# Patient Record
Sex: MALE | Race: WHITE | NOT HISPANIC OR LATINO | URBAN - METROPOLITAN AREA
[De-identification: names, ages, dates, MRNs, and addresses within clinical notes are randomized per-mention and may not be internally consistent; named-entity substitution may affect disease eponyms.]

---

## 2022-03-17 ENCOUNTER — TELEPHONE ENCOUNTER (OUTPATIENT)
Dept: URBAN - METROPOLITAN AREA CLINIC 9 | Facility: CLINIC | Age: 87
End: 2022-03-17

## 2022-05-31 ENCOUNTER — OFFICE VISIT (OUTPATIENT)
Dept: URBAN - METROPOLITAN AREA CLINIC 60 | Facility: CLINIC | Age: 87
End: 2022-05-31

## 2022-06-28 ENCOUNTER — CLAIMS CREATED FROM THE CLAIM WINDOW (OUTPATIENT)
Dept: URBAN - METROPOLITAN AREA CLINIC 60 | Facility: CLINIC | Age: 87
End: 2022-06-28
Payer: MEDICARE

## 2022-06-28 ENCOUNTER — DASHBOARD ENCOUNTERS (OUTPATIENT)
Age: 87
End: 2022-06-28

## 2022-06-28 ENCOUNTER — LAB OUTSIDE AN ENCOUNTER (OUTPATIENT)
Dept: URBAN - METROPOLITAN AREA CLINIC 60 | Facility: CLINIC | Age: 87
End: 2022-06-28

## 2022-06-28 ENCOUNTER — WEB ENCOUNTER (OUTPATIENT)
Dept: URBAN - METROPOLITAN AREA CLINIC 60 | Facility: CLINIC | Age: 87
End: 2022-06-28

## 2022-06-28 VITALS
SYSTOLIC BLOOD PRESSURE: 124 MMHG | TEMPERATURE: 98.2 F | DIASTOLIC BLOOD PRESSURE: 86 MMHG | BODY MASS INDEX: 24.34 KG/M2 | HEART RATE: 67 BPM | WEIGHT: 170 LBS | OXYGEN SATURATION: 98 % | HEIGHT: 70 IN

## 2022-06-28 DIAGNOSIS — Z80.0 FAMILY HISTORY OF COLON CANCER: ICD-10-CM

## 2022-06-28 DIAGNOSIS — Z12.11 COLON CANCER SCREENING: ICD-10-CM

## 2022-06-28 DIAGNOSIS — Z86.010 PERSONAL HISTORY OF COLONIC POLYPS: ICD-10-CM

## 2022-06-28 DIAGNOSIS — Z01.818 ENCOUNTER FOR OTHER PREPROCEDURAL EXAMINATION: ICD-10-CM

## 2022-06-28 PROBLEM — 428283002: Status: ACTIVE | Noted: 2022-06-28

## 2022-06-28 PROCEDURE — 99203 OFFICE O/P NEW LOW 30 MIN: CPT | Performed by: NURSE PRACTITIONER

## 2022-06-28 RX ORDER — OMEPRAZOLE 20 MG/1
1 CAPSULE 30 MINUTES BEFORE MORNING MEAL CAPSULE, DELAYED RELEASE ORAL ONCE A DAY
Status: ACTIVE | COMMUNITY

## 2022-06-28 RX ORDER — CETIRIZINE HYDROCHLORIDE 10 MG/1
1 TABLET TABLET, FILM COATED ORAL ONCE A DAY
Status: ACTIVE | COMMUNITY

## 2022-06-28 RX ORDER — HYDROCHLOROTHIAZIDE 25 MG/1
1 TABLET IN THE MORNING TABLET ORAL ONCE A DAY
Status: ACTIVE | COMMUNITY

## 2022-06-28 RX ORDER — BUDESONIDE AND FORMOTEROL FUMARATE DIHYDRATE 160; 4.5 UG/1; UG/1
2 PUFFS AEROSOL RESPIRATORY (INHALATION) TWICE A DAY
Status: ACTIVE | COMMUNITY

## 2022-06-28 RX ORDER — ATORVASTATIN CALCIUM 40 MG/1
1 TABLET TABLET, FILM COATED ORAL ONCE A DAY
Status: ACTIVE | COMMUNITY

## 2022-06-28 RX ORDER — SUCRALFATE 1 G/1
1 TABLET ON AN EMPTY STOMACH TABLET ORAL TWICE A DAY
Status: ACTIVE | COMMUNITY

## 2022-06-28 RX ORDER — MONTELUKAST 10 MG/1
1 TABLET TABLET, FILM COATED ORAL ONCE A DAY
Status: ACTIVE | COMMUNITY

## 2022-06-28 NOTE — PHYSICAL EXAM NECK/THYROID:
normal appearance , without tenderness upon palpation , no deformities , Thyroid normal size , no thyroid nodules , no masses , no JVD , thyroid nontender

## 2022-06-28 NOTE — HPI-TODAY'S VISIT:
This patient with a history of chronic GERD presents to the office for follow up. He is also due for colorectal cancer screening based on his history of a 12mm tubular adenoma in 2019. He has a family history of colon cancer.   Today he reports doing well. He remains on Omeprazole 20mg daily which [facility] and he denies heartburn, dysphagia or reflux providing he takes his medication. He denies consumption of multiple gastrotoxins. He denies any current bowel problems including diarrhea, constipation, change in bowel habits, dark stools, abdominal pain, rectal bleeding or weight loss.   His last colonoscopy was in May 2019. Findings included a 12mm  tubular adenoma removed from the ascending colon and 2 x 5mm hyperplastic polyps removed from the rectum. Diverticulosis was noted in the descending and sigmoid colon and small internal hemorrhoids were found. A benign anal tag was noted. His EGD at that time revealed mild chronic gastritis, fundic gland polyps a 3cm hiatal hernia, mild esophagitis.

## 2022-06-28 NOTE — PHYSICAL EXAM CHEST:
no lesions, no deformities, no traumatic injuries, no significant scars are present, no masses present, breathing is unlabored without accessory muscle use,normal breath sounds

## 2022-07-09 ENCOUNTER — TELEPHONE ENCOUNTER (OUTPATIENT)
Dept: URBAN - METROPOLITAN AREA CLINIC 121 | Facility: CLINIC | Age: 87
End: 2022-07-09

## 2022-07-09 RX ORDER — ASPIRIN 81 MG/1
TABLET, COATED ORAL ONCE A DAY
Refills: 0 | OUTPATIENT
Start: 2015-08-06 | End: 2016-02-18

## 2022-07-09 RX ORDER — SUCRALFATE 1 G/1
TABLET ORAL
Refills: 0 | OUTPATIENT
Start: 2014-07-21 | End: 2015-08-06

## 2022-07-09 RX ORDER — MONTELUKAST SODIUM 10 MG/1
TABLET, FILM COATED ORAL
Refills: 0 | OUTPATIENT
Start: 2015-08-06 | End: 2016-02-18

## 2022-07-09 RX ORDER — HYDROCHLOROTHIAZIDE 12.5 MG/1
CAPSULE ORAL
Refills: 0 | OUTPATIENT
Start: 2013-06-03 | End: 2014-07-21

## 2022-07-09 RX ORDER — HYDROCHLOROTHIAZIDE 12.5 MG/1
CAPSULE ORAL
Refills: 0 | OUTPATIENT
Start: 2011-01-31 | End: 2011-05-02

## 2022-07-09 RX ORDER — HYDROCHLOROTHIAZIDE 12.5 MG/1
CAPSULE ORAL
Refills: 0 | OUTPATIENT
Start: 2011-05-02 | End: 2013-06-03

## 2022-07-09 RX ORDER — HYDROCHLOROTHIAZIDE 12.5 MG/1
CAPSULE ORAL
Refills: 0 | OUTPATIENT
Start: 2014-07-21 | End: 2015-08-06

## 2022-07-09 RX ORDER — OMEPRAZOLE 20 MG/1
TABLET, DELAYED RELEASE ORAL ONCE A DAY
Refills: 0 | OUTPATIENT
Start: 2015-08-06 | End: 2016-02-18

## 2022-07-09 RX ORDER — MONTELUKAST 10 MG/1
TABLET, FILM COATED ORAL ONCE A DAY
Refills: 0 | OUTPATIENT
Start: 2018-04-11 | End: 2019-03-06

## 2022-07-09 RX ORDER — HYDROCHLOROTHIAZIDE 12.5 MG/1
CAPSULE ORAL ONCE A DAY
Refills: 0 | OUTPATIENT
Start: 2015-08-06 | End: 2016-02-18

## 2022-07-09 RX ORDER — ASPIRIN 81 MG/1
TABLET, DELAYED RELEASE ORAL ONCE A DAY
Refills: 0 | OUTPATIENT
Start: 2018-04-11 | End: 2019-03-06

## 2022-07-09 RX ORDER — OMEPRAZOLE 20 MG/1
TABLET, DELAYED RELEASE ORAL ONCE A DAY
Refills: 0 | OUTPATIENT
Start: 2014-07-21 | End: 2015-08-06

## 2022-07-09 RX ORDER — IBUPROFEN 200 MG
TABLET ORAL ONCE A DAY
Refills: 0 | OUTPATIENT
Start: 2018-04-11 | End: 2019-03-06

## 2022-07-09 RX ORDER — HYDROCHLOROTHIAZIDE 12.5 MG/1
CAPSULE ORAL ONCE A DAY
Refills: 0 | OUTPATIENT
Start: 2017-04-04 | End: 2018-04-11

## 2022-07-09 RX ORDER — METOPROLOL SUCCINATE 50 MG/1
TABLET, EXTENDED RELEASE ORAL
Refills: 0 | OUTPATIENT
Start: 2010-10-12 | End: 2011-01-31

## 2022-07-09 RX ORDER — HYDROCHLOROTHIAZIDE 25 MG/1
TABLET ORAL ONCE A DAY
Refills: 0 | OUTPATIENT
Start: 2019-03-06 | End: 2019-03-06

## 2022-07-09 RX ORDER — METOPROLOL SUCCINATE 50 MG/1
TABLET, EXTENDED RELEASE ORAL
Refills: 0 | OUTPATIENT
Start: 2015-08-06 | End: 2016-02-18

## 2022-07-09 RX ORDER — OMEPRAZOLE 20 MG/1
CAPSULE, DELAYED RELEASE ORAL ONCE A DAY
Refills: 0 | OUTPATIENT
Start: 2017-04-04 | End: 2018-04-11

## 2022-07-09 RX ORDER — ACETAMINOPHEN, DEXTROMETHORPHAN HBR, DOXYLAMINE SUCCINATE, PHENYLEPHRINE HCL 10-5-325MG
KIT ORAL ONCE A DAY
Refills: 0 | OUTPATIENT
Start: 2018-04-11 | End: 2019-03-06

## 2022-07-09 RX ORDER — CETIRIZINE HYDROCHLORIDE 10 MG/1
TABLET, FILM COATED ORAL ONCE A DAY
Refills: 0 | OUTPATIENT
Start: 2019-03-06 | End: 2019-03-06

## 2022-07-09 RX ORDER — CHOLECALCIFEROL (VITAMIN D3) 25 MCG
CAPSULE ORAL ONCE A DAY
Refills: 0 | OUTPATIENT
Start: 2015-08-06 | End: 2016-02-18

## 2022-07-09 RX ORDER — METOPROLOL SUCCINATE 50 MG/1
TABLET, EXTENDED RELEASE ORAL ONCE A DAY
Refills: 0 | OUTPATIENT
Start: 2018-04-11 | End: 2019-03-06

## 2022-07-09 RX ORDER — ASPIRIN 81 MG/1
TABLET, COATED ORAL
Refills: 0 | OUTPATIENT
Start: 2014-07-21 | End: 2015-08-06

## 2022-07-09 RX ORDER — ASPIRIN 81 MG/1
TABLET, COATED ORAL
Refills: 0 | OUTPATIENT
Start: 2011-05-02 | End: 2013-06-03

## 2022-07-09 RX ORDER — SIMVASTATIN 40 MG/1
TABLET, FILM COATED ORAL
Refills: 0 | OUTPATIENT
Start: 2010-10-12 | End: 2011-01-31

## 2022-07-09 RX ORDER — SUCRALFATE 1 G/1
TABLET ORAL TWICE A DAY
Refills: 0 | OUTPATIENT
Start: 2018-04-11 | End: 2019-03-06

## 2022-07-09 RX ORDER — OMEPRAZOLE 20 MG/1
TABLET, DELAYED RELEASE ORAL AS NEEDED
Refills: 0 | OUTPATIENT
Start: 2013-06-03 | End: 2013-06-03

## 2022-07-09 RX ORDER — NIACIN 1000 MG
TABLET, EXTENDED RELEASE ORAL
Refills: 0 | OUTPATIENT
Start: 2010-10-12 | End: 2011-01-31

## 2022-07-09 RX ORDER — MONTELUKAST SODIUM 10 MG/1
TABLET, FILM COATED ORAL
Refills: 0 | OUTPATIENT
Start: 2011-05-02 | End: 2013-06-03

## 2022-07-09 RX ORDER — METOPROLOL SUCCINATE 50 MG/1
TABLET, EXTENDED RELEASE ORAL ONCE A DAY
Refills: 0 | OUTPATIENT
Start: 2019-03-06 | End: 2019-03-06

## 2022-07-09 RX ORDER — SUCRALFATE 1 G/1
TABLET ORAL
Refills: 0 | OUTPATIENT
Start: 2013-10-28 | End: 2014-07-21

## 2022-07-09 RX ORDER — ASPIRIN 81 MG/1
TABLET, COATED ORAL
Refills: 0 | OUTPATIENT
Start: 2010-10-12 | End: 2011-01-31

## 2022-07-09 RX ORDER — MONTELUKAST 10 MG/1
TABLET, FILM COATED ORAL ONCE A DAY
Refills: 0 | OUTPATIENT
Start: 2017-04-04 | End: 2018-04-11

## 2022-07-09 RX ORDER — ATORVASTATIN CALCIUM 40 MG/1
TABLET, FILM COATED ORAL ONCE A DAY
Refills: 0 | OUTPATIENT
Start: 2018-04-11 | End: 2019-03-06

## 2022-07-09 RX ORDER — METOPROLOL SUCCINATE 50 MG/1
TABLET, EXTENDED RELEASE ORAL
Refills: 0 | OUTPATIENT
Start: 2011-01-31 | End: 2011-05-02

## 2022-07-09 RX ORDER — IBUPROFEN 200 MG
TABLET ORAL ONCE A DAY
Refills: 0 | OUTPATIENT
Start: 2017-04-04 | End: 2018-04-11

## 2022-07-09 RX ORDER — ASPIRIN 81 MG/1
TABLET, DELAYED RELEASE ORAL ONCE A DAY
Refills: 0 | OUTPATIENT
Start: 2017-04-04 | End: 2018-04-11

## 2022-07-09 RX ORDER — OMEPRAZOLE 40 MG/1
CAPSULE, DELAYED RELEASE ORAL
Refills: 0 | OUTPATIENT
Start: 2011-05-02 | End: 2011-06-08

## 2022-07-09 RX ORDER — OMEGA-3S/DHA/EPA/FISH OIL 120-180-60
CAPSULE,DELAYED RELEASE (ENTERIC COATED) ORAL
Refills: 0 | OUTPATIENT
Start: 2014-07-21 | End: 2015-08-06

## 2022-07-09 RX ORDER — HYDROCHLOROTHIAZIDE 12.5 MG/1
CAPSULE ORAL
Refills: 0 | OUTPATIENT
Start: 2010-10-12 | End: 2011-01-31

## 2022-07-09 RX ORDER — SUCRALFATE 1 G/1
TABLET ORAL TWICE A DAY
Refills: 0 | OUTPATIENT
Start: 2017-04-04 | End: 2018-04-11

## 2022-07-09 RX ORDER — FENOFIBRATE 160 MG/1
TABLET ORAL
Refills: 0 | OUTPATIENT
Start: 2010-10-12 | End: 2011-01-31

## 2022-07-09 RX ORDER — OMEGA-3S/DHA/EPA/FISH OIL 120-180-60
CAPSULE,DELAYED RELEASE (ENTERIC COATED) ORAL
Refills: 0 | OUTPATIENT
Start: 2013-06-03 | End: 2014-07-21

## 2022-07-09 RX ORDER — FENOFIBRATE 160 MG/1
TABLET ORAL
Refills: 0 | OUTPATIENT
Start: 2011-05-02 | End: 2013-06-03

## 2022-07-09 RX ORDER — ATORVASTATIN CALCIUM 40 MG/1
TABLET, FILM COATED ORAL ONCE A DAY
Refills: 0 | OUTPATIENT
Start: 2017-04-04 | End: 2018-04-11

## 2022-07-09 RX ORDER — NIACIN 1000 MG
TABLET, EXTENDED RELEASE ORAL
Refills: 0 | OUTPATIENT
Start: 2011-01-31 | End: 2011-05-02

## 2022-07-09 RX ORDER — OMEPRAZOLE 20 MG/1
TABLET, DELAYED RELEASE ORAL AS NEEDED
Refills: 0 | OUTPATIENT
Start: 2011-06-08 | End: 2013-06-03

## 2022-07-09 RX ORDER — OMEPRAZOLE 20 MG/1
TABLET, DELAYED RELEASE ORAL ONCE A DAY
Refills: 0 | OUTPATIENT
Start: 2013-06-03 | End: 2014-07-21

## 2022-07-09 RX ORDER — FENOFIBRATE 160 MG/1
TABLET ORAL
Refills: 0 | OUTPATIENT
Start: 2011-01-31 | End: 2011-05-02

## 2022-07-09 RX ORDER — ATORVASTATIN CALCIUM 40 MG/1
TABLET, FILM COATED ORAL
Refills: 0 | OUTPATIENT
Start: 2014-07-21 | End: 2015-08-06

## 2022-07-09 RX ORDER — METOPROLOL SUCCINATE 50 MG/1
TABLET, EXTENDED RELEASE ORAL ONCE A DAY
Refills: 0 | OUTPATIENT
Start: 2017-04-04 | End: 2018-04-11

## 2022-07-09 RX ORDER — ASPIRIN 81 MG/1
TABLET, COATED ORAL
Refills: 0 | OUTPATIENT
Start: 2013-06-03 | End: 2014-07-21

## 2022-07-09 RX ORDER — OMEPRAZOLE 20 MG/1
CAPSULE, DELAYED RELEASE ORAL ONCE A DAY
Refills: 0 | OUTPATIENT
Start: 2018-04-11 | End: 2019-03-06

## 2022-07-09 RX ORDER — ATORVASTATIN CALCIUM 40 MG/1
TABLET, FILM COATED ORAL
Refills: 0 | OUTPATIENT
Start: 2015-08-06 | End: 2016-02-18

## 2022-07-09 RX ORDER — SIMVASTATIN 40 MG/1
TABLET, FILM COATED ORAL
Refills: 0 | OUTPATIENT
Start: 2011-05-02 | End: 2013-06-03

## 2022-07-09 RX ORDER — OMEGA-3S/DHA/EPA/FISH OIL 120-180-60
CAPSULE,DELAYED RELEASE (ENTERIC COATED) ORAL
Refills: 0 | OUTPATIENT
Start: 2015-08-06 | End: 2016-02-18

## 2022-07-09 RX ORDER — HYDROCHLOROTHIAZIDE 12.5 MG/1
CAPSULE ORAL ONCE A DAY
Refills: 0 | OUTPATIENT
Start: 2018-04-11 | End: 2019-03-06

## 2022-07-09 RX ORDER — SIMVASTATIN 40 MG/1
TABLET, FILM COATED ORAL
Refills: 0 | OUTPATIENT
Start: 2011-01-31 | End: 2011-05-02

## 2022-07-09 RX ORDER — ACETAMINOPHEN, DEXTROMETHORPHAN HBR, DOXYLAMINE SUCCINATE, PHENYLEPHRINE HCL 10-5-325MG
KIT ORAL ONCE A DAY
Refills: 0 | OUTPATIENT
Start: 2017-04-04 | End: 2018-04-11

## 2022-07-09 RX ORDER — NIACIN 1000 MG
TABLET, EXTENDED RELEASE ORAL
Refills: 0 | OUTPATIENT
Start: 2011-05-02 | End: 2013-06-03

## 2022-07-09 RX ORDER — METOPROLOL SUCCINATE 50 MG/1
TABLET, EXTENDED RELEASE ORAL
Refills: 0 | OUTPATIENT
Start: 2011-05-02 | End: 2013-06-03

## 2022-07-09 RX ORDER — ASPIRIN 81 MG/1
TABLET, COATED ORAL
Refills: 0 | OUTPATIENT
Start: 2011-01-31 | End: 2011-05-02

## 2022-07-09 RX ORDER — SUCRALFATE 1 G/1
TABLET ORAL
Refills: 0 | OUTPATIENT
Start: 2015-08-06 | End: 2016-02-18

## 2022-07-09 RX ORDER — OMEPRAZOLE 40 MG/1
CAPSULE, DELAYED RELEASE ORAL
Refills: 0 | OUTPATIENT
Start: 2011-01-31 | End: 2011-05-02

## 2022-07-09 RX ORDER — ATORVASTATIN CALCIUM 40 MG/1
TABLET, FILM COATED ORAL
Refills: 0 | OUTPATIENT
Start: 2013-06-03 | End: 2014-07-21

## 2022-07-10 ENCOUNTER — TELEPHONE ENCOUNTER (OUTPATIENT)
Dept: URBAN - METROPOLITAN AREA CLINIC 121 | Facility: CLINIC | Age: 87
End: 2022-07-10

## 2022-07-10 RX ORDER — IBUPROFEN 200 MG
TABLET ORAL ONCE A DAY
Refills: 0 | Status: ACTIVE | COMMUNITY
Start: 2019-03-06

## 2022-07-10 RX ORDER — CETIRIZINE HYDROCHLORIDE 10 MG/1
TABLET, FILM COATED ORAL ONCE A DAY
Refills: 0 | Status: ACTIVE | COMMUNITY
Start: 2019-03-06

## 2022-07-10 RX ORDER — AZITHROMYCIN 250 MG
TAKE PER PHARMACY INSTRUCTIONS CAPSULE ORAL TAKE AS DIRECTED
Refills: 0 | Status: ACTIVE | COMMUNITY
Start: 2010-11-18

## 2022-07-10 RX ORDER — OMEPRAZOLE 20 MG/1
TABLET, DELAYED RELEASE ORAL ONCE A DAY
Refills: 2 | Status: ACTIVE | COMMUNITY
Start: 2016-02-18

## 2022-07-10 RX ORDER — METOPROLOL SUCCINATE 50 MG/1
TABLET, EXTENDED RELEASE ORAL ONCE A DAY
Refills: 0 | Status: ACTIVE | COMMUNITY
Start: 2019-03-06

## 2022-07-10 RX ORDER — MONTELUKAST SODIUM 10 MG/1
TABLET, FILM COATED ORAL
Refills: 2 | Status: ACTIVE | COMMUNITY
Start: 2016-02-18

## 2022-07-10 RX ORDER — ASPIRIN 81 MG/1
TABLET, DELAYED RELEASE ORAL ONCE A DAY
Refills: 0 | Status: ACTIVE | COMMUNITY
Start: 2019-03-06

## 2022-07-10 RX ORDER — CHOLECALCIFEROL (VITAMIN D3) 25 MCG
CAPSULE ORAL ONCE A DAY
Refills: 2 | Status: ACTIVE | COMMUNITY
Start: 2016-02-18

## 2022-07-10 RX ORDER — HYDROCORTISONE ACETATE 25 MG/1
SUPPOSITORY RECTAL TWICE A DAY
Refills: 2 | Status: ACTIVE | COMMUNITY
Start: 2005-02-15

## 2022-07-10 RX ORDER — ATORVASTATIN CALCIUM 40 MG/1
TABLET, FILM COATED ORAL ONCE A DAY
Refills: 0 | Status: ACTIVE | COMMUNITY
Start: 2019-03-06

## 2022-07-10 RX ORDER — OMEGA-3S/DHA/EPA/FISH OIL 120-180-60
CAPSULE,DELAYED RELEASE (ENTERIC COATED) ORAL
Refills: 2 | Status: ACTIVE | COMMUNITY
Start: 2016-02-18

## 2022-07-10 RX ORDER — HYDROCORTISONE ACETATE 25 MG/1
INSERT 1 PR BID X 10 DAYS SUPPOSITORY RECTAL TWICE A DAY
Refills: 0 | Status: ACTIVE | COMMUNITY
Start: 2013-08-19

## 2022-07-10 RX ORDER — OMEPRAZOLE 20 MG/1
CAPSULE, DELAYED RELEASE ORAL ONCE A DAY
Refills: 0 | Status: ACTIVE | COMMUNITY
Start: 2019-03-06

## 2022-07-10 RX ORDER — HYDROCHLOROTHIAZIDE 25 MG/1
TABLET ORAL ONCE A DAY
Refills: 0 | Status: ACTIVE | COMMUNITY
Start: 2019-03-06

## 2022-07-10 RX ORDER — SUCRALFATE 1 G/1
TABLET ORAL
Refills: 2 | Status: ACTIVE | COMMUNITY
Start: 2016-02-18

## 2022-07-10 RX ORDER — HYDROCHLOROTHIAZIDE 12.5 MG/1
CAPSULE ORAL ONCE A DAY
Refills: 2 | Status: ACTIVE | COMMUNITY
Start: 2016-02-18

## 2022-07-10 RX ORDER — ATORVASTATIN CALCIUM 40 MG/1
TABLET, FILM COATED ORAL
Refills: 2 | Status: ACTIVE | COMMUNITY
Start: 2016-02-18

## 2022-07-10 RX ORDER — SUCRALFATE 1 G/1
TABLET ORAL TWICE A DAY
Refills: 0 | Status: ACTIVE | COMMUNITY
Start: 2019-03-06

## 2022-07-10 RX ORDER — ASPIRIN 81 MG/1
TABLET, COATED ORAL ONCE A DAY
Refills: 2 | Status: ACTIVE | COMMUNITY
Start: 2016-02-18

## 2022-07-10 RX ORDER — MONTELUKAST 10 MG/1
TABLET, FILM COATED ORAL ONCE A DAY
Refills: 0 | Status: ACTIVE | COMMUNITY
Start: 2019-03-06

## 2022-07-10 RX ORDER — METOPROLOL SUCCINATE 50 MG/1
TABLET, EXTENDED RELEASE ORAL
Refills: 2 | Status: ACTIVE | COMMUNITY
Start: 2016-02-18

## 2022-07-10 RX ORDER — OMEPRAZOLE 40 MG/1
TAKE 1 TAB PO QAM 30-60 MINS BEFORE BREAKFAST CAPSULE, DELAYED RELEASE ORAL ONCE A DAY
Refills: 2 | Status: ACTIVE | COMMUNITY
Start: 2010-10-22

## 2022-07-14 ENCOUNTER — TELEPHONE ENCOUNTER (OUTPATIENT)
Dept: URBAN - METROPOLITAN AREA CLINIC 63 | Facility: CLINIC | Age: 87
End: 2022-07-14

## 2022-07-30 ENCOUNTER — TELEPHONE ENCOUNTER (OUTPATIENT)
Age: 87
End: 2022-07-30

## 2022-07-31 ENCOUNTER — TELEPHONE ENCOUNTER (OUTPATIENT)
Age: 87
End: 2022-07-31